# Patient Record
Sex: FEMALE | Race: WHITE
[De-identification: names, ages, dates, MRNs, and addresses within clinical notes are randomized per-mention and may not be internally consistent; named-entity substitution may affect disease eponyms.]

---

## 2019-09-19 ENCOUNTER — HOSPITAL ENCOUNTER (OUTPATIENT)
Dept: HOSPITAL 62 - SC | Age: 54
Discharge: HOME | End: 2019-09-19
Attending: SURGERY
Payer: OTHER GOVERNMENT

## 2019-09-19 DIAGNOSIS — I10: ICD-10-CM

## 2019-09-19 DIAGNOSIS — Z79.899: ICD-10-CM

## 2019-09-19 DIAGNOSIS — J45.909: ICD-10-CM

## 2019-09-19 DIAGNOSIS — A63.0: Primary | ICD-10-CM

## 2019-09-19 DIAGNOSIS — K21.9: ICD-10-CM

## 2019-09-19 PROCEDURE — 46922 EXCISION OF ANAL LESION(S): CPT

## 2019-09-19 PROCEDURE — C9290 INJ, BUPIVACAINE LIPOSOME: HCPCS

## 2019-09-19 PROCEDURE — 88305 TISSUE EXAM BY PATHOLOGIST: CPT

## 2019-09-19 NOTE — DISCHARGE SUMMARY
Discharge Summary (SDC)





- Discharge


Final Diagnosis: 





Anal condyloma


Date of Surgery: 09/19/19


Discharge Date: 09/19/19


Condition: Stable


Forms:  [facility] Discharge Plan


Treatment or Instructions: 


Discharge home.  Diet as tolerated.  Activity: Nonstrenuous.  Follow-up with me 

in 7 days.  Norco 10/325 mg p.o. every 6 hours as needed for pain.  Ibuprofen 

800 mg p.o. 3 times daily with meals.  5% lidocaine ointment to rectum 3 times 

daily.  Neosporin over-the-counter ointment to rectum 3 times daily.  Warm sits 

baths in soapy water twice daily and after bowel movements.  Colace stool softe

ner 2 tablets p.o. twice daily.  Fiber supplement twice daily.


Referrals: 


ABDIFATAH JENSEN MD [ACTIVE STAFF] - 


Discharge Diet: As Tolerated


Respiratory Treatments at Home: Deep Breathing/Coughing, Incentive Spirometer


Discharge Activity: Balance Activity w/Rest


Home Care Assistance: None Needed


Report the Following to Your Physician Immediately: Shortness of Breath, Nausea,

Vomiting, Increase in Pain, Fever over 101 Degrees, Unusual Bleeding, Redness

## 2019-09-19 NOTE — OPERATIVE REPORT
Nonrecallable Operative Report


DATE OF SURGERY: 09/19/19


PREOPERATIVE DIAGNOSIS: Anal condyloma


POSTOPERATIVE DIAGNOSIS: Anal condyloma


OPERATION: Excision and fulguration of anal condyloma x6


SURGEON: ABDIFATAH JENSEN


ANESTHESIA: GA


TISSUE REMOVED OR ALTERED: Anal condyloma


COMPLICATIONS: 





None apparent


ESTIMATED BLOOD LOSS: Minimal


PROCEDURE: 





Procedure in detail: After informed consent was obtained, the patient was 

brought to the operating room and laid in the prone jackknife position.  The 

area of the perianal skin and rectum were prepped and draped in a normal sterile

fashion.  Exparel was used for local anesthesia.  There were 5 separate anal 

condyloma present externally.  These were excised and the base was fulgurated.  

The defects were then loosely approximated using a subcutaneous 3-0 Vicryl 

suture.  Once this was completed, a Hill-Scott retractor was inserted into 

the rectum.  One small, suspicious area was present in the posterior position.  

This was cauterized.  After this was completed, a dressing was fashioned, and 

the procedure was concluded.  All sponge, instrument, and needle counts were 

correct x2.





Condition: Stable.